# Patient Record
Sex: MALE | Race: WHITE
[De-identification: names, ages, dates, MRNs, and addresses within clinical notes are randomized per-mention and may not be internally consistent; named-entity substitution may affect disease eponyms.]

---

## 2019-06-26 ENCOUNTER — HOSPITAL ENCOUNTER (EMERGENCY)
Dept: HOSPITAL 41 - JD.ED | Age: 69
Discharge: HOME | End: 2019-06-26
Payer: MEDICARE

## 2019-06-26 VITALS — DIASTOLIC BLOOD PRESSURE: 64 MMHG | SYSTOLIC BLOOD PRESSURE: 97 MMHG

## 2019-06-26 DIAGNOSIS — E11.9: ICD-10-CM

## 2019-06-26 DIAGNOSIS — J44.9: ICD-10-CM

## 2019-06-26 DIAGNOSIS — Z79.899: ICD-10-CM

## 2019-06-26 DIAGNOSIS — I20.0: Primary | ICD-10-CM

## 2019-06-26 DIAGNOSIS — I10: ICD-10-CM

## 2019-06-26 DIAGNOSIS — Z79.82: ICD-10-CM

## 2019-06-26 DIAGNOSIS — Z79.84: ICD-10-CM

## 2019-06-26 DIAGNOSIS — Z87.891: ICD-10-CM

## 2019-06-26 PROCEDURE — 85027 COMPLETE CBC AUTOMATED: CPT

## 2019-06-26 PROCEDURE — 82962 GLUCOSE BLOOD TEST: CPT

## 2019-06-26 PROCEDURE — 99285 EMERGENCY DEPT VISIT HI MDM: CPT

## 2019-06-26 PROCEDURE — 85007 BL SMEAR W/DIFF WBC COUNT: CPT

## 2019-06-26 PROCEDURE — 82553 CREATINE MB FRACTION: CPT

## 2019-06-26 PROCEDURE — 96361 HYDRATE IV INFUSION ADD-ON: CPT

## 2019-06-26 PROCEDURE — 85379 FIBRIN DEGRADATION QUANT: CPT

## 2019-06-26 PROCEDURE — 84484 ASSAY OF TROPONIN QUANT: CPT

## 2019-06-26 PROCEDURE — 96360 HYDRATION IV INFUSION INIT: CPT

## 2019-06-26 PROCEDURE — 85610 PROTHROMBIN TIME: CPT

## 2019-06-26 PROCEDURE — 86140 C-REACTIVE PROTEIN: CPT

## 2019-06-26 PROCEDURE — 83735 ASSAY OF MAGNESIUM: CPT

## 2019-06-26 PROCEDURE — 80053 COMPREHEN METABOLIC PANEL: CPT

## 2019-06-26 PROCEDURE — 36415 COLL VENOUS BLD VENIPUNCTURE: CPT

## 2019-06-26 PROCEDURE — 83880 ASSAY OF NATRIURETIC PEPTIDE: CPT

## 2019-06-26 PROCEDURE — 93005 ELECTROCARDIOGRAM TRACING: CPT

## 2019-06-26 PROCEDURE — 85730 THROMBOPLASTIN TIME PARTIAL: CPT

## 2019-06-26 PROCEDURE — 71045 X-RAY EXAM CHEST 1 VIEW: CPT

## 2019-06-26 NOTE — EDM.PDOC
ED HPI GENERAL MEDICAL PROBLEM





- General


Chief Complaint: Chest Pain


Stated Complaint: CHEST PAINS


Time Seen by Provider: 06/26/19 19:38


Source of Information: Reports: Patient, Family (spouse)


History Limitations: Reports: No Limitations





- History of Present Illness


INITIAL COMMENTS - FREE TEXT/NARRATIVE: 





69-year-old male presents to the ED with his wife after developing sudden onset 

of central chest pressure squeezing pain while waiting for their supper to 

arrive in a restaurant tonight. This seemed to start a rate around 1900 hrs. He 

then broke out in a diaphoretic sweat and had to leave the table before the 

food arrived. He has no heartburn or indigestion or burping or belching today. 

He's had a past history of myocardial infarction in 1994. Apparently he 

suffered an MI and angiogram was negative. He has known stents in place. He 

quit smoking at that time. Was out mowing the lawn and finished up around 1815 

hours tonight without any problems. Patient did have his nitroglycerin with him 

and he took 2 of them he states it took quite a while barium to work but they 

seem to ease the pain up substantially. Reports the pressure discomfort is 

still a 2 out of 10 in the central chest. When he arrived his blood pressure 

was 93/64. It has improved to 100/68 now. Reports the pain does not seem to 

radiate through his back up into his neck or shoulder. Feels like there is a 

pressure or heaviness in his central chest and I appreciate that he's working 

hard to breathe. O2 sats 92% on room air. Patient denies any pain in his 

calves. No past history of DVT. He does take a baby aspirin every morning. No 

recent changes to any of his other medications.


Onset: Today


Onset Date: 06/26/19


Onset Time: 19:00


Duration: Minutes:


Location: Reports: Chest (Central chest pressure heaviness or squeezing 

discomfort with no radiation of the pain. Started about 1900 hrs. while he was 

awaiting his meal at a local restaurant. Vicodin is what became very 

diaphoretic cool and clammy and somewhat short of breath. You have is Dr. 

tablets with him and he is taken 2 tablets thus far. He reports a reduction in 

the)


Quality: Reports: Ache, Pressure


Improves with: Reports: Medication (She took 2 nitroglycerin tablets presumably 

0.4 mg strength.)


Worsens with: Reports: None


Context: Reports: Other.  Denies: Activity, Exercise, Lifting, Sick Contact, 

Trauma


Associated Symptoms: Reports: Chest Pain, Diaphoresis, Loss of Appetite, Malaise

, Shortness of Breath, Weakness.  Denies: Confusion, Cough, cough w sputum, 

Fever/Chills, Headaches, Nausea/Vomiting, Rash, Seizure, Syncope


Treatments PTA: Reports: Other (see below) (He took 2 nitroglycerin tablets 5 

minutes apart at approximate 1915 to 1925 hrs.)





- Related Data


 Allergies











Allergy/AdvReac Type Severity Reaction Status Date / Time


 


No Known Allergies Allergy   Verified 06/26/19 19:39











Home Meds: 


 Home Meds





Aspirin [Halfprin] 81 mg PO DAILY 01/14/17 [History]


Bimatoprost [LUMIGAN 0.01% Ophth Soln] 1 drop EYEBOTH QPM 01/14/17 [History]


Calcium Carbonate/Vitamin D3 [Calcium 600 + Vit D 200] 1 tab PO BID 01/14/17 [

History]


Fish Oil/Omega-3 Fatty Acids [Fish Oil] 1 each PO BID 01/14/17 [History]


Folic Acid. 400 mg PO DAILY 01/14/17 [History]


LORazepam 1 mg PO BEDTIME PRN 01/14/17 [History]


Metoprolol Tartrate [Lopressor] 50 mg PO BID 01/14/17 [History]


metFORMIN [Glucophage XR] 1,000 mg PO BIDMEALS 01/14/17 [History]











Past Medical History


HEENT History: Reports: Glaucoma, Macular Degeneration


Cardiovascular History: Reports: Hypertension, MI (1994. Unclear if he had 

angina but that time but he did not have any stenting or need for bypass at 

that time.)


Respiratory History: Reports: COPD (Used to smoke quite heavily. Quit in 1994.)


Musculoskeletal History: Reports: Arthritis


Psychiatric History: Reports: Other (See Below)


Other Psychiatric History: states has trouble sleeping at time


Endocrine/Metabolic History: Reports: Diabetes, Type II (On metformin 1000 mg 

twice a day.), Obesity/BMI 30+


Dermatologic History: Reports: Melanoma





- Infectious Disease History


Infectious Disease History: Reports: Chicken Pox, Measles, Mumps





- Past Surgical History


HEENT Surgical History: Reports: Eye Surgery





Social & Family History





- Tobacco Use


Smoking Status *Q: Former Smoker


Used Tobacco, but Quit: Yes


Month/Year Tobacco Last Used: 1995





- Caffeine Use


Caffeine Use: Reports: Soda


Other Caffeine Use: mountain dew daily





- Living Situation & Occupation


Living situation: Reports: 


Occupation: Retired





ED ROS GENERAL





- Review of Systems


Review Of Systems: See Below


Constitutional: Reports: Weakness, Fatigue, Diaphoresis (With onset of chest 

pressure discomfort.), Decreased Appetite.  Denies: Fever, Chills, Weight Loss


HEENT: Reports: Glasses


Respiratory: Reports: Shortness of Breath.  Denies: Wheezing, Pleuritic Chest 

Pain, Cough, Sputum


Cardiovascular: Reports: Chest Pain, Dyspnea on Exertion (Mild chronically), 

Lightheadedness.  Denies: Blood Pressure Problem (Antral chest pressure 

squeezing discomfort.), Claudication, Edema (Especially noted after taking the 

2 nitroglycerin tablets.), Orthopnea


Endocrine: Reports: Fatigue


GI/Abdominal: Reports: No Symptoms


: Reports: Frequency, Other (Nocturia usually 1.)


Musculoskeletal: Reports: Back Pain (Occasional back pain knee pain.)


Skin: Reports: Diaphoresis


Neurological: Reports: No Symptoms (Diaphoresis with onset of central chest 

pressure discomfort.), Dizziness (After taking the 2 nitroglycerin tablets.)


Psychiatric: Reports: No Symptoms





ED EXAM, GENERAL





- Physical Exam


Exam: See Below


Exam Limited By: No Limitations


General Appearance: Alert, WD/WN, Mild Distress, Other (He still tachypnea: 

Initial assessment. Blood pressure is slowly improving presumably dropped after 

taking 2 nitroglycerin tablets. Currently 100/68.)


Eye Exam: Bilateral Eye: Normal Inspection


Throat/Mouth: Normal Inspection, Normal Lips, Normal Oropharynx


Head: Atraumatic, Normocephalic


Neck: Normal Inspection, Supple, Non-Tender, Full Range of Motion.  No: Carotid 

Bruit, Lymphadenopathy (L), Lymphadenopathy (R)


Respiratory/Chest: No Respiratory Distress, Lungs Clear, Normal Breath Sounds, 

No Accessory Muscle Use, Decreased Breath Sounds.  No: Rales, Rhonchi (Breath 

sounds are mildly diminished to both lower lung fields. No adventitial sounds 

noted), Wheezing


Cardiovascular: Regular Rate, Rhythm, No Edema, No Gallop, No Murmur, No Rub.  

No: Normal Peripheral Pulses


Peripheral Pulses: 1+: Posterior Tibial (L), Posterior Tibial (R), Dorsalis 

Pedis (L), Dorsalis Pedis (R)


GI/Abdominal: Normal Bowel Sounds, Soft, Non-Tender, No Organomegaly, No 

Abnormal Bruit, No Mass, Pelvis Stable, Other


Back Exam: Normal Inspection, Full Range of Motion.  No: CVA Tenderness (L), 

CVA Tenderness (R)


Extremities: Normal Inspection, Normal Range of Motion, Non-Tender, No Pedal 

Edema, Other (Calves are nonswollen and nontender. No clinical evidence of DVT.)


Neurological: Alert, Oriented, CN II-XII Intact, Normal Cognition


Psychiatric: Normal Affect, Anxious (Mildly anxious and I believe in)


Skin Exam: Intact, Normal Color, No Rash, Cool (Skin is mildly cool to touch.).

  No: Diaphoretic (Not at this time)





EKG INTERPRETATION


EKG Date: 06/26/19


Time: 19:41


Rhythm: NSR


Rate (Beats/Min): 84


Axis: Normal


P-Wave: Present


QRS: Other (There are Q waves in leads II, III, and F aVF compatible with an 

old inferior wall myocardial infarction.)


ST-T: Normal


QT: Normal


EKG Interpretation Comments: 





Abnormal ECG





Course





- Vital Signs


Last Recorded V/S: 


 Last Vital Signs











Temp  37.0 C   06/26/19 19:39


 


Pulse  85   06/26/19 19:39


 


Resp  16   06/26/19 19:39


 


BP  97/64   06/26/19 19:39


 


Pulse Ox  90 L  06/26/19 20:14














- Orders/Labs/Meds


Orders: 


 Active Orders 24 hr











 Category Date Time Status


 


 Blood Glucose Check, Bedside [RC] ONETIME Care  06/26/19 19:59 Active


 


 EKG Documentation Completion [RC] ASDIRECTED Care  06/26/19 19:45 Active


 


 EKG Documentation Completion [RC] ASDIRECTED Care  06/26/19 22:03 Active


 


 EKG Documentation Completion [RC] STAT Care  06/26/19 19:50 Active


 


 Oxygen Therapy [RC] ASDIRECTED Care  06/26/19 19:50 Active


 


 Chest 1V Frontal [CR] Stat Exams  06/26/19 19:50 Taken


 


 EKG 12 Lead [EK] Stat Ther  06/26/19 19:44 Ordered


 


 EKG 12 Lead [EK] Stat Ther  06/26/19 22:03 Ordered











Labs: 


 Laboratory Tests











  06/26/19 06/26/19 06/26/19 Range/Units





  19:46 19:46 19:46 


 


WBC  6.36    (4.23-9.07)  K/mm3


 


RBC  4.86    (4.63-6.08)  M/mm3


 


Hgb  15.6    (13.7-17.5)  gm/L


 


Hct  44.9    (40.1-51.0)  %


 


MCV  92.4 H    (79.0-92.2)  fl


 


MCH  32.1    (25.7-32.2)  pg


 


MCHC  34.7    (32.2-35.5)  g/dl


 


RDW Std Deviation  43.2    (35.1-43.9)  fL


 


Plt Count  264    (163-337)  K/mm3


 


MPV  8.9 L    (9.4-12.3)  fl


 


Neutrophils % (Manual)  57    (40-60)  %


 


Band Neutrophils %  0    (0-10)  %


 


Lymphocytes % (Manual)  30    (20-40)  %


 


Atypical Lymphs %  0    %


 


Monocytes % (Manual)  7    (2-10)  %


 


Eosinophils % (Manual)  5    (0.8-7.0)  %


 


Basophils % (Manual)  1    (0.2-1.2)  


 


Platelet Estimate  Adequate    


 


Plt Morphology Comment  Normal    


 


RBC Morph Comment  Normal    


 


PT   11.0   (9.5-12.1)  SECONDS


 


INR   1.01   


 


APTT   25   (24-31)  SECONDS


 


D-Dimer, Quantitative     (0.19-0.50)  mg/L


 


Sodium    140  (136-145)  mEq/L


 


Potassium    4.0  (3.5-5.1)  mEq/L


 


Chloride    104  ()  mEq/L


 


Carbon Dioxide    24  (21-32)  mEq/L


 


Anion Gap    16.0 H  (5-15)  


 


BUN    16  (7-18)  mg/dL


 


Creatinine    1.1  (0.7-1.3)  mg/dL


 


Est Cr Clr Drug Dosing    64.52  mL/min


 


Estimated GFR (MDRD)    > 60  (>60)  mL/min


 


BUN/Creatinine Ratio    14.5  (14-18)  


 


Glucose    152 H  ()  mg/dL


 


POC Glucose     ()  mg/dL


 


Calcium    9.2  (8.5-10.1)  mg/dL


 


Magnesium    1.7 L  (1.8-2.4)  mg/dl


 


Total Bilirubin    0.7  (0.2-1.0)  mg/dL


 


AST    26  (15-37)  U/L


 


ALT    34  (16-63)  U/L


 


Alkaline Phosphatase    61  ()  U/L


 


CK-MB (CK-2)    0.7  (0-3.6)  ng/ml


 


Troponin I    < 0.017  (0.00-0.056)  ng/mL


 


C-Reactive Protein    < 0.2  (<1.0)  mg/dL


 


NT-Pro-B Natriuret Pep     (0-125)  pg/mL


 


Total Protein    7.0  (6.4-8.2)  g/dl


 


Albumin    4.0  (3.4-5.0)  g/dl


 


Globulin    3.0  gm/dL


 


Albumin/Globulin Ratio    1.3  (1-2)  














  06/26/19 06/26/19 06/26/19 Range/Units





  19:46 19:46 20:19 


 


WBC     (4.23-9.07)  K/mm3


 


RBC     (4.63-6.08)  M/mm3


 


Hgb     (13.7-17.5)  gm/L


 


Hct     (40.1-51.0)  %


 


MCV     (79.0-92.2)  fl


 


MCH     (25.7-32.2)  pg


 


MCHC     (32.2-35.5)  g/dl


 


RDW Std Deviation     (35.1-43.9)  fL


 


Plt Count     (163-337)  K/mm3


 


MPV     (9.4-12.3)  fl


 


Neutrophils % (Manual)     (40-60)  %


 


Band Neutrophils %     (0-10)  %


 


Lymphocytes % (Manual)     (20-40)  %


 


Atypical Lymphs %     %


 


Monocytes % (Manual)     (2-10)  %


 


Eosinophils % (Manual)     (0.8-7.0)  %


 


Basophils % (Manual)     (0.2-1.2)  


 


Platelet Estimate     


 


Plt Morphology Comment     


 


RBC Morph Comment     


 


PT     (9.5-12.1)  SECONDS


 


INR     


 


APTT     (24-31)  SECONDS


 


D-Dimer, Quantitative  0.53 H    (0.19-0.50)  mg/L


 


Sodium     (136-145)  mEq/L


 


Potassium     (3.5-5.1)  mEq/L


 


Chloride     ()  mEq/L


 


Carbon Dioxide     (21-32)  mEq/L


 


Anion Gap     (5-15)  


 


BUN     (7-18)  mg/dL


 


Creatinine     (0.7-1.3)  mg/dL


 


Est Cr Clr Drug Dosing     mL/min


 


Estimated GFR (MDRD)     (>60)  mL/min


 


BUN/Creatinine Ratio     (14-18)  


 


Glucose     ()  mg/dL


 


POC Glucose    157 H  ()  mg/dL


 


Calcium     (8.5-10.1)  mg/dL


 


Magnesium     (1.8-2.4)  mg/dl


 


Total Bilirubin     (0.2-1.0)  mg/dL


 


AST     (15-37)  U/L


 


ALT     (16-63)  U/L


 


Alkaline Phosphatase     ()  U/L


 


CK-MB (CK-2)     (0-3.6)  ng/ml


 


Troponin I     (0.00-0.056)  ng/mL


 


C-Reactive Protein     (<1.0)  mg/dL


 


NT-Pro-B Natriuret Pep   63   (0-125)  pg/mL


 


Total Protein     (6.4-8.2)  g/dl


 


Albumin     (3.4-5.0)  g/dl


 


Globulin     gm/dL


 


Albumin/Globulin Ratio     (1-2)  














  06/26/19 Range/Units





  22:00 


 


WBC   (4.23-9.07)  K/mm3


 


RBC   (4.63-6.08)  M/mm3


 


Hgb   (13.7-17.5)  gm/L


 


Hct   (40.1-51.0)  %


 


MCV   (79.0-92.2)  fl


 


MCH   (25.7-32.2)  pg


 


MCHC   (32.2-35.5)  g/dl


 


RDW Std Deviation   (35.1-43.9)  fL


 


Plt Count   (163-337)  K/mm3


 


MPV   (9.4-12.3)  fl


 


Neutrophils % (Manual)   (40-60)  %


 


Band Neutrophils %   (0-10)  %


 


Lymphocytes % (Manual)   (20-40)  %


 


Atypical Lymphs %   %


 


Monocytes % (Manual)   (2-10)  %


 


Eosinophils % (Manual)   (0.8-7.0)  %


 


Basophils % (Manual)   (0.2-1.2)  


 


Platelet Estimate   


 


Plt Morphology Comment   


 


RBC Morph Comment   


 


PT   (9.5-12.1)  SECONDS


 


INR   


 


APTT   (24-31)  SECONDS


 


D-Dimer, Quantitative   (0.19-0.50)  mg/L


 


Sodium   (136-145)  mEq/L


 


Potassium   (3.5-5.1)  mEq/L


 


Chloride   ()  mEq/L


 


Carbon Dioxide   (21-32)  mEq/L


 


Anion Gap   (5-15)  


 


BUN   (7-18)  mg/dL


 


Creatinine   (0.7-1.3)  mg/dL


 


Est Cr Clr Drug Dosing   mL/min


 


Estimated GFR (MDRD)   (>60)  mL/min


 


BUN/Creatinine Ratio   (14-18)  


 


Glucose   ()  mg/dL


 


POC Glucose   ()  mg/dL


 


Calcium   (8.5-10.1)  mg/dL


 


Magnesium   (1.8-2.4)  mg/dl


 


Total Bilirubin   (0.2-1.0)  mg/dL


 


AST   (15-37)  U/L


 


ALT   (16-63)  U/L


 


Alkaline Phosphatase   ()  U/L


 


CK-MB (CK-2)  0.5  (0-3.6)  ng/ml


 


Troponin I  < 0.017  (0.00-0.056)  ng/mL


 


C-Reactive Protein   (<1.0)  mg/dL


 


NT-Pro-B Natriuret Pep   (0-125)  pg/mL


 


Total Protein   (6.4-8.2)  g/dl


 


Albumin   (3.4-5.0)  g/dl


 


Globulin   gm/dL


 


Albumin/Globulin Ratio   (1-2)  











Meds: 


Medications














Discontinued Medications














Generic Name Dose Route Start Last Admin





  Trade Name Freq  PRN Reason Stop Dose Admin


 


Aspirin  324 mg  06/26/19 19:48  06/26/19 20:01





  Aspirin  PO  06/26/19 19:49  324 mg





  ONETIME ONE   Administration





     





     





     





     


 


Aspirin  Confirm  06/26/19 19:53  06/26/19 20:01





  Aspirin  Administered  06/26/19 19:54  Not Given





  Dose   





  324 mg   





  .ROUTE   





  .STK-MED ONE   





     





     





     





     


 


Aspirin  Confirm  06/26/19 19:53  06/26/19 20:01





  Aspirin  Administered  06/26/19 19:54  Not Given





  Dose   





  324 mg   





  .ROUTE   





  .STK-MED ONE   





     





     





     





     


 


Aspirin  Confirm  06/26/19 20:00  





  Aspirin  Administered  06/26/19 20:01  





  Dose   





  324 mg   





  .ROUTE   





  .STK-MED ONE   





     





     





     





     


 


Nitroglycerin/Dextrose  25 mg in 250 mls @ 3 mls/hr  06/26/19 20:00  





  Nitroglycerin  25 Mg/D5w 250 Ml  IV   





  TITRATE LAURA   





     





     





  Protocol   





  5 MCG/MIN   


 


Sodium Chloride  1,000 mls @ 100 mls/hr  06/26/19 20:00  06/26/19 19:57





  Normal Saline  IV   100 mls/hr





  ASDIRECTED LAURA   Administration





     





     





     





     














- Radiology Interpretation


Free Text/Narrative:: 


69-year-old male presents to the ED with to development of central chest 

pressure discomfort with squeezing component well seated at a local restaurant 

waiting for his meal to come. Chest pain started about 1900 hrs. He has a 

remote history of MI in 1994 and apparently did see cardiology services and 

subclavian and remember that time but he was told there is nothing they could 

do for him in terms that he suffered a heart attack but no stents were 

indicated. He stopped smoking at that time. He has a type II by a diabetic 

controlled with metformin 1000 mg twice a day. Does take aspirin 81 mg every 

morning. Examination reveals stigmata of mild COPD with no adventitial sounds. 

Heart is sinus with no murmurs identified. Blood pressures low at 97/68 

initially presumably from taking 2 nitroglycerin tabs. Clinically I believe he 

isn't likely an evolving myocardial infarction. He still has some central chest 

discomfort 2 out of 10. ECG does not show any signs of acute ischemia. Diffuse T

-wave flattening. Q waves in II, III, and AVL, aVF suggestive of an old 

inferior wall myocardial infarction. Plan :aspirin 324 mg chewed. Nitro drip at 

5 mcg/m. Will follow his blood pressure closely. Labs to be done including 

cardiac markers. Chest x-ray times one view. D-dimer will be done as well. He'

ll be started on oxygen since his O2 sats are 92% on room air.








- Re-Assessments/Exams


Free Text/Narrative Re-Assessment/Exam: 





06/26/19 20:30: Patient states he is chest pain-free. Blood pressure is 

hovering around 100 systolic. Chest x-ray done portably reveals some rightward 

deviation of the chest x-ray. This makes the left hilar area more prominent 

suggesting left prominent pulmonary artery. Cardiac silhouette is upper limits 

of normal in size. Lungs appear to be clear without pleural effusion.





06/26/19 21:00 Initial white count is 6.36 differential is pending. Hemoglobin 

is normal at 15.6 with hematocrit of 44.9. MCV is mildly elevated at 92.4. PT 

is 11.0 with an INR of 1.01. PTT is 25. D-dimer 0.53 normal for this patient's 

age. Sodium is 140 with a potassium of 4.0. Chloride is 104 bicarbonate 24. And 

a gap is mildly elevated at 16.0. BUN is 16 with a creatinine of 1.1. Blood 

glucose 152 in the lab on 57 at the bedside. Calcium 9.2 with magnesium 

slightly low at 1.7. Liver function is normal. CK-MB is 0.7 with a troponin I 

of less than 0.017. C-reactive protein is less than 0.2. BNP is 63. Total 

protein 7.0 with no been fraction of 4.0.





06/26/19 21:10 patient's history is fairly convincing for ischemia. He will 

have repeat cardiac markers at 2200 hrs. and remains pain-free at this time. 

Blood pressure is slowly improving. It is currently 117/68. Heart rate is 71 

and sinus





06/26/19 22:24  patient remains pain-free. ECG #2 completed reveals sinus 

rhythm at 71/m. There is mildly decreased voltage throughout the precordial 

leads. Q waves II, III, and F aVF but with an old inferior wall myocardial 

infarction. No signs of acute ischemia. Awaiting second set of cardiac markers.





06/26/19 22:56  Repeat cardiac markers returned normal with a CK-MB fraction of 

0.5 and troponin I of less than 0.017. Patient will be discharged to home 

however I am going to advise him to follow-up with his personal care physician 

or cardiologist to have an outpatient ECG stress test carried out  due to 

history of remote infarct in 1994. LDL with finger could cause to his 

significant pain like this is esophageal spasm which would've been relieved by 

nitroglycerin as well. Is having intermittent heartburn but states is no worse 

than usual. Discharged to home in care of his wife.














Departure





- Departure


Time of Disposition: 22:57


Disposition: Home, Self-Care 01


Condition: Fair


Clinical Impression: 


 Chest pain in adult, Unstable angina pectoris





Instructions:  Angina Pectoris, Easy-to-Read


Referrals: 


Ridge Plata MD [Primary Care Provider] - 


Forms:  ED Department Discharge


Additional Instructions: 


Evaluation the emergency room tonight in regards to development of severe 

central chest pressure discomfort while awaiting your supper at a local 

restaurant tonight. This pressure persisted and was bad enough to make him 

break out in a diffuse scleral sweat recall diaphoresis. Pain improved with 

taking nitroglycerin tablets 2 before coming to the ED. He still had 

significant chest pressure discomfort upon arrival in the ED. ECG done in the 

ED revealed sinus rhythm with evidence of an old heart attack in the right 

coronary artery or inferior wall of the heart. ECG done at 2 hours later 

revealed it to be unchanged from the first one. Lab tests occluding cardiac 

markers 2.2 be negative for any signs of heart attack. However your history is 

highly suspect for possible heart related illness. No signs of blood clot were 

identified in your lung on testing either. It is my suggestion to continue 

current medications but follow-up with your personal care provider to arrange 

for an outpatient ECG stress test within the next week to 10 days to make sure 

you do not have any recurrence of heart related illness. Coarse return to the 

ED if you develop any further chest pressure or pain .





- My Orders


Last 24 Hours: 


My Active Orders





06/26/19 19:44


EKG 12 Lead [EK] Stat 





06/26/19 19:45


EKG Documentation Completion [RC] ASDIRECTED 





06/26/19 19:50


EKG Documentation Completion [RC] STAT 


Oxygen Therapy [RC] ASDIRECTED 


Chest 1V Frontal [CR] Stat 





06/26/19 19:59


Blood Glucose Check, Bedside [RC] ONETIME 





06/26/19 22:03


EKG Documentation Completion [RC] ASDIRECTED 


EKG 12 Lead [EK] Stat 














- Assessment/Plan


Last 24 Hours: 


My Active Orders





06/26/19 19:44


EKG 12 Lead [EK] Stat 





06/26/19 19:45


EKG Documentation Completion [RC] ASDIRECTED 





06/26/19 19:50


EKG Documentation Completion [RC] STAT 


Oxygen Therapy [RC] ASDIRECTED 


Chest 1V Frontal [CR] Stat 





06/26/19 19:59


Blood Glucose Check, Bedside [RC] ONETIME 





06/26/19 22:03


EKG Documentation Completion [RC] ASDIRECTED 


EKG 12 Lead [EK] Stat

## 2019-06-27 NOTE — CR
Chest: Portable view of the chest was obtained.

 

Comparison: Prior chest x-ray of 01/14/17.

 

Heart size and mediastinum are normal.  Lungs are clear.  Bony 

structures are grossly intact.

 

Impression:

1.  Nothing acute is seen on portable chest x-ray.

 

Diagnostic code #1

## 2019-09-19 NOTE — PCM.PREANE
Preanesthetic Assessment





- Procedure


Proposed Procedure: 





cataract left





- Anesthesia/Transfusion/Family Hx


Anesthesia History: Prior Anesthesia Without Reaction


Family History of Anesthesia Reaction: No


Transfusion History: No Prior Transfusion(s)





- Review of Systems


General: No Symptoms


Pulmonary: Cough, Other (bacterial bronchitis- just getting over it.)


Cardiovascular: No Symptoms


Gastrointestinal: No Symptoms


Neurological: No Symptoms


Other: Reports: Diabetes





- Physical Assessment


NPO Status Date: 09/18/19


NPO Status Time: 20:00


Vital Signs: 





 Last Vital Signs











Temp  97.4 F   09/19/19 07:40


 


Pulse  65   09/19/19 07:40


 


Resp  16   09/19/19 07:40


 


BP  115/75   09/19/19 07:40


 


Pulse Ox  96   09/19/19 07:40











Height: 5 ft 9 in


Weight: 115.212 kg


ASA Class: 2


Mental Status: Alert & Oriented x3


Airway Class: Mallampati = 1


Dentition: Reports: Dentures (out)


Thyro-Mental Finger Breadths: 3


Mouth Opening Finger Breadths: 3


ROM/Head Extension: Full


Lungs: Clear to Auscultation, Normal Respiratory Effort


Cardiovascular: Regular Rate, Irregular Rhythm





- Allergies


Allergies/Adverse Reactions: 


 Allergies











Allergy/AdvReac Type Severity Reaction Status Date / Time


 


No Known Allergies Allergy   Verified 06/26/19 19:39














- Blood


Blood Available: No





- Anesthesia Plan


Pre-Op Medication Ordered: Beta Blocker


Beta Blocker: Metoprolol


Med Last Dose Date: 09/19/19


Med Last Dose Time: 07:30





- Acknowledgements


Anesthesia Type Planned: MAC


Pt an Appropriate Candidate for the Planned Anesthesia: Yes


Alternatives and Risks of Anesthesia Discussed w Pt/Guardian: Yes


Pt/Guardian Understands and Agrees with Anesthesia Plan: Yes





PreAnesthesia Questionnaire


HEENT History: Reports: Glaucoma, Macular Degeneration


Cardiovascular History: Reports: Hypertension, MI (1994. Unclear if he had 

angina but that time but he did not have any stenting or need for bypass at 

that time.)


Respiratory History: Reports: COPD (Used to smoke quite heavily. Quit in 1994.)


Musculoskeletal History: Reports: Arthritis


Psychiatric History: Reports: Other (See Below)


Other Psychiatric History: states has trouble sleeping at time


Endocrine/Metabolic History: Reports: Diabetes, Type II (On metformin 1000 mg 

twice a day.), Obesity/BMI 30+


Dermatologic History: Reports: Melanoma





- Infectious Disease History


Infectious Disease History: Reports: Chicken Pox, Measles, Mumps





- Past Surgical History


HEENT Surgical History: Reports: Eye Surgery





- HOME MEDS


Home Medications: 


 Home Meds





Bimatoprost [LUMIGAN 0.01% Ophth Soln] 1 drop EYEBOTH QPM 01/14/17 [History]


Calcium Carbonate/Vitamin D3 [Calcium 600 + Vit D 200] 1 tab PO BID 01/14/17 [

History]


Fish Oil/Omega-3 Fatty Acids [Fish Oil] 1 each PO BID 01/14/17 [History]


Metoprolol Tartrate [Lopressor] 50 mg PO BID 01/14/17 [History]


metFORMIN [Glucophage XR] 1,000 mg PO BIDMEALS 01/14/17 [History]


Enalapril Maleate 20 mg PO DAILY 09/18/19 [History]


Pravastatin [Pravachol] 40 mg PO DAILY 09/18/19 [History]


predniSONE [Prednisone] 20 mg PO ASDIRECTED 09/18/19 [History]











- CURRENT (IN HOUSE) MEDS


Current Meds: 





 Current Medications





Brimonidine Tartrate (Alphagan 0.2% Ophth Soln)  0 ml EYELF ASDIRECTED LAURA


   Stop: 09/19/19 18:00


   Last Admin: 09/19/19 09:10 Dose:  1 drop


Cefuroxime Sodium (Zinacef)  0 mg EYELF ASDIRECTED LAURA


   Stop: 09/19/19 18:00


Lidocaine HCl (Xylocaine-Mpf 1%)  0 ml INJECT ASDIRECTED LAURA


   Stop: 09/19/19 18:00


Phenylephrine HCl (Nelson-Synephrine 2.5% Ophth Soln)  0 ml EYELF ASDIRECTED LAURA


   Stop: 09/19/19 18:00


   Last Admin: 09/19/19 09:15 Dose:  1 drop


Pilocarpine HCl (Pilocar 4% Ophth Soln)  0 ml EYELF ASDIRECTED LAURA


   Stop: 09/19/19 18:00


Polymyxin/Trimethoprim Sulfate (Polytrim Ophth Soln)  0 ml EYELF ASDIRECTED LAURA


   Stop: 09/19/19 18:00


   Last Admin: 09/19/19 09:05 Dose:  1 drop


Tetracaine HCl (Tetracaine 0.5% Steri-Unit Sol)  0 ml EYELF ASDIRECTED LAURA


   Stop: 09/19/19 18:00


   Last Admin: 09/19/19 09:37 Dose:  1 drop


Tropicamide (Mydriacyl 1% Ophth Soln)  0 ml EYELF ASDIRECTED LAURA


   Stop: 09/19/19 18:00


   Last Admin: 09/19/19 09:20 Dose:  1 drop

## 2019-09-19 NOTE — [id number]
Post Anesthesia Note





- EVALUATION WITHIN 48HRS OF ANESTHETIC


Vital Signs in Normal Range: Yes


Patient Participated in Evaluation: Yes


Respiratory Function Stable: Yes


Airway Patent: Yes


Cardiovascular Function Stable: Yes


Hydration Status Stable: Yes


Pain Control Satisfactory: Yes


Nausea and Vomiting Control Satisfactory: Yes


Mental Status Recovered: Yes


Vital Signs: 


 Last Vital Signs











Temp  36.3 C   09/19/19 07:40


 


Pulse  65   09/19/19 07:40


 


Resp  16   09/19/19 07:40


 


BP  115/75   09/19/19 07:40


 


Pulse Ox  96   09/19/19 07:40

## 2021-06-14 NOTE — EDM.PDOC
ED HPI GENERAL MEDICAL PROBLEM





- General


Chief Complaint: Chest Pain


Stated Complaint: SOB/CHEST DISCOMFORT


Time Seen by Provider: 06/14/21 17:07


Source of Information: Reports: Patient, RN Notes Reviewed





- History of Present Illness


INITIAL COMMENTS - FREE TEXT/NARRATIVE: 





71 yr old male has had some chest pressure off and on for 2 weeks.  Has become 

more persistent yesterday and today. Has also been more short of breath with 

exertion than usual.  Hx Htn, type 2 diabetes.  Not coughing, no fever or 

chills. 


  ** Left Chest


Pain Score (Numeric/FACES): 4





- Related Data


                                    Allergies











Allergy/AdvReac Type Severity Reaction Status Date / Time


 


No Known Allergies Allergy   Verified 06/14/21 17:01











Home Meds: 


                                    Home Meds





Calcium Carbonate/Vitamin D3 [Calcium 600 + Vit D 200] 1 tab PO BID 01/14/17 

[History]


Metoprolol Tartrate [Lopressor] 50 mg PO BID 01/14/17 [History]


metFORMIN [Glucophage XR] 1,000 mg PO BIDMEALS 01/14/17 [History]


Enalapril Maleate 20 mg PO BID 09/18/19 [History]


Pravastatin [Pravachol] 40 mg PO DAILY 09/18/19 [History]


Aspirin [Aspirin EC] 81 mg PO DAILY 06/14/21 [History]


Biotin 5,000 mcg SL 06/14/21 [History]


Folic Acid 1 mg PO DAILY 06/14/21 [History]


LORazepam [Lorazepam] 1 mg PO ASDIRECTED PRN 06/14/21 [History]


Methotrexate 2.5 mg PO Q7D 06/14/21 [History]


Ubidecarenone [Coq-10] 100 mg PO DAILY 06/14/21 [History]











Past Medical History


HEENT History: Reports: Glaucoma, Macular Degeneration


Cardiovascular History: Reports: Hypertension, MI


Respiratory History: Reports: COPD


Musculoskeletal History: Reports: Arthritis


Psychiatric History: Reports: Other (See Below)


Other Psychiatric History: states has trouble sleeping at time


Endocrine/Metabolic History: Reports: Diabetes, Type II, Obesity/BMI 30+


Dermatologic History: Reports: Melanoma





- Infectious Disease History


Infectious Disease History: Reports: Chicken Pox, Measles, Mumps





- Past Surgical History


HEENT Surgical History: Reports: Eye Surgery





Social & Family History





- Tobacco Use


Tobacco Use Status *Q: Current Every Day Tobacco User


Years of Tobacco use: 50


Packs/Tins Daily: 0.2





- Caffeine Use


Caffeine Use: Reports: Soda


Other Caffeine Use: mountain dew daily





- Recreational Drug Use


Recreational Drug Use: No





- Living Situation & Occupation


Living situation: Reports: 


Occupation: Retired





ED ROS GENERAL





- Review of Systems


Review Of Systems: See Below


Constitutional: Denies: Fever, Chills, Diaphoresis


HEENT: Reports: No Symptoms


Respiratory: Reports: Shortness of Breath.  Denies: Pleuritic Chest Pain, Cough


Cardiovascular: Reports: Chest Pain, Palpitations (occasional)


GI/Abdominal: Denies: Abdominal Pain, Nausea, Vomiting


Musculoskeletal: Denies: Shoulder Pain, Arm Pain, Back Pain


Skin: Reports: No Symptoms


Neurological: Reports: No Symptoms





ED EXAM, GENERAL





- Physical Exam


Exam: See Below


General Appearance: Alert, No Apparent Distress


Throat/Mouth: Normal Inspection


Head: Atraumatic


Neck: Supple, Other (no JVD)


Respiratory/Chest: No Respiratory Distress, Lungs Clear, Normal Breath Sounds.  

No: Rales, Rhonchi, Wheezing


Cardiovascular: Irregularly Irregular


GI/Abdominal: Soft, Non-Tender.  No: Guarding


Back Exam: No: CVA Tenderness (L), CVA Tenderness (R)


Extremities: Normal Inspection.  No: Pedal Edema, Leg Pain, Increased Warmth, 

Redness


Skin Exam: Warm, Dry, Normal Color


  ** #1 Interpretation


EKG Date: 06/14/21


Rhythm: A-Fib


Rate (Beats/Min): 96


Axis: Normal


P-Wave: Absent


QRS: Normal


ST-T: Other (flatenned T waves inf. leads.)





Course





- Vital Signs


Last Recorded V/S: 


                                Last Vital Signs











Temp  97.1 F   06/14/21 16:58


 


Pulse  108 H  06/14/21 17:33


 


Resp  26 H  06/14/21 16:58


 


BP  121/59 L  06/14/21 17:33


 


Pulse Ox  96   06/14/21 16:58














- Orders/Labs/Meds


Orders: 


                               Active Orders 24 hr











 Category Date Time Status


 


 EKG 12 Lead [EKG Documentation Completion] [RC] STAT Care  06/14/21 17:00 

Active


 


 Peripheral IV Care [RC] .AS DIRECTED Care  06/14/21 17:15 Active


 


 Chest 1V Frontal [CR] Stat Exams  06/14/21 17:15 Taken


 


 Sodium Chloride 0.9% [Saline Flush] Med  06/14/21 17:14 Active





 10 ml FLUSH ASDIRECTED PRN   


 


 Peripheral IV Insertion Adult [OM.PC] Stat Oth  06/14/21 17:15 Ordered








                                Medication Orders





Sodium Chloride (Sodium Chloride 0.9% 10 Ml Syringe)  10 ml FLUSH ASDIRECTED PRN


   PRN Reason: Keep Vein Open


   Last Admin: 06/14/21 17:20  Dose: 10 ml


   Documented by: [email or web address]








Labs: 


                                Laboratory Tests











  06/14/21 06/14/21 Range/Units





  17:05 17:05 


 


WBC  6.36   (4.23-9.07)  K/mm3


 


RBC  4.66   (4.63-6.08)  M/mm3


 


Hgb  15.1   (13.7-17.5)  gm/dl


 


Hct  44.6   (40.1-51.0)  %


 


MCV  95.7 H D   (79.0-92.2)  fl


 


MCH  32.4 H   (25.7-32.2)  pg


 


MCHC  33.9   (32.2-35.5)  g/dl


 


RDW Std Deviation  45.8 H   (35.1-43.9)  fL


 


Plt Count  254   (163-337)  K/mm3


 


MPV  9.1 L   (9.4-12.3)  fl


 


Neut % (Auto)  57.8   (34.0-67.9)  %


 


Lymph % (Auto)  25.6   (21.8-53.1)  %


 


Mono % (Auto)  11.3   (5.3-12.2)  %


 


Eos % (Auto)  4.7   (0.8-7.0)  


 


Baso % (Auto)  0.3   (0.1-1.2)  %


 


Neut # (Auto)  3.67   (1.78-5.38)  K/mm3


 


Lymph # (Auto)  1.63   (1.32-3.57)  K/mm3


 


Mono # (Auto)  0.72   (0.30-0.82)  K/mm3


 


Eos # (Auto)  0.30   (0.04-0.54)  K/mm3


 


Baso # (Auto)  0.02   (0.01-0.08)  K/mm3


 


Sodium   140  (136-145)  mEq/L


 


Potassium   4.2  (3.5-5.1)  mEq/L


 


Chloride   104  ()  mEq/L


 


Carbon Dioxide   24  (21-32)  mEq/L


 


Anion Gap   16.2 H  (5-15)  


 


BUN   12  (7-18)  mg/dL


 


Creatinine   0.8  (0.7-1.3)  mg/dL


 


Est Cr Clr Drug Dosing   83.47  mL/min


 


Estimated GFR (MDRD)   > 60  (>60)  mL/min


 


BUN/Creatinine Ratio   15.0  (14-18)  


 


Glucose   136 H  (70-99)  mg/dL


 


Calcium   9.0  (8.5-10.1)  mg/dL


 


Total Bilirubin   0.5  (0.2-1.0)  mg/dL


 


AST   12 L  (15-37)  U/L


 


ALT   19  (16-63)  U/L


 


Alkaline Phosphatase   58  ()  U/L


 


Troponin I   < 0.017  (0.00-0.056)  ng/mL


 


Total Protein   6.7  (6.4-8.2)  g/dl


 


Albumin   3.6  (3.4-5.0)  g/dl


 


Globulin   3.1  gm/dL


 


Albumin/Globulin Ratio   1.2  (1-2)  











Meds: 


Medications











Generic Name Dose Route Start Last Admin





  Trade Name Freq  PRN Reason Stop Dose Admin


 


Sodium Chloride  10 ml  06/14/21 17:14  06/14/21 17:20





  Sodium Chloride 0.9% 10 Ml Syringe  FLUSH   10 ml





  ASDIRECTED PRN   Administration





  Keep Vein Open  














Discontinued Medications














Generic Name Dose Route Start Last Admin





  Trade Name Freq  PRN Reason Stop Dose Admin


 


Metoprolol Tartrate  50 mg  06/14/21 17:24  06/14/21 17:33





  Metoprolol Tartrate 50 Mg Tab  PO  06/14/21 17:25  50 mg





  ONETIME ONE   Administration














- Re-Assessments/Exams


Free Text/Narrative Re-Assessment/Exam: 





06/14/21 19:32


Gave him a dose of oral metropolol and with that his rate has come down into the

 80's and 90's.  No known prior hx of a fib.  trop is neg, other labs are fine, 

CXR looks normal.  I did discuss need for reasonable rate control and also 

stroke risk.  He does not want to start anticoagulation this evening.  He is on 

daily aspirin, metropolol 50 bid in addition to his other meds.  Discharge 

instr. as documented.  





Departure





- Departure


Time of Disposition: 19:15


Disposition: Home, Self-Care 01


Condition: Fair


Clinical Impression: 


 Atypical chest pain





Atrial fibrillation


Qualifiers:


 Atrial fibrillation type: paroxysmal Qualified Code(s): I48.0 - Paroxysmal 

atrial fibrillation





Instructions:  Nonspecific Chest Pain, Adult, Easy-to-Read, Atrial Fibrillation,

 Easy-to-Read


Referrals: 


Ridge Plata MD [Primary Care Provider] - 


Forms:  ED Department Discharge


Additional Instructions: 


Continue current medications as previously prescribed.  Try avoid salty food and

salt as best you can.  Avoid carbs and sugar foods as best you can.  [facility] 

has good cardiac healthy dietary guidelines available.  The [facility] diet is

also cardiac healthy.  See Dr Plata, Dr Gomez, or Barbara in 2 to 3 days or 

next available appointment.  Return to ED as needed.  





Sepsis Event Note (ED)





- Evaluation


Sepsis Screening Result: No Definite Risk





- Focused Exam


Vital Signs: 


                                   Vital Signs











  Temp Pulse Pulse Resp BP BP Pulse Ox


 


 06/14/21 17:33   108 H    121/59 L  


 


 06/14/21 16:58  97.1 F   112 H  26 H   146/87 H  96














- My Orders


Last 24 Hours: 


My Active Orders





06/14/21 17:00


EKG 12 Lead [EKG Documentation Completion] [RC] STAT 





06/14/21 17:14


Sodium Chloride 0.9% [Saline Flush]   10 ml FLUSH ASDIRECTED PRN 





06/14/21 17:15


Peripheral IV Care [RC] .AS DIRECTED 


Chest 1V Frontal [CR] Stat 


Peripheral IV Insertion Adult [OM.PC] Stat 














- Assessment/Plan


Last 24 Hours: 


My Active Orders





06/14/21 17:00


EKG 12 Lead [EKG Documentation Completion] [RC] STAT 





06/14/21 17:14


Sodium Chloride 0.9% [Saline Flush]   10 ml FLUSH ASDIRECTED PRN 





06/14/21 17:15


Peripheral IV Care [RC] .AS DIRECTED 


Chest 1V Frontal [CR] Stat 


Peripheral IV Insertion Adult [OM.PC] Stat

## 2021-06-15 NOTE — CR
Chest: Portable view of the chest was obtained.

 

Comparison: Prior chest x-ray of 06/26/19.

 

Heart size and mediastinum are within normal limits.  Lungs are clear 

with no acute parenchymal change.  No acute osseous abnormality is 

appreciated.

 

Impression:

1.  Nothing acute is seen on portable chest x-ray.

 

Diagnostic code #1

## 2021-07-21 NOTE — EDM.PDOC
ED HPI GENERAL MEDICAL PROBLEM





- General


Chief Complaint: Head Injury


Stated Complaint: HEAD INJURY


Time Seen by Provider: 07/21/21 18:09


Source of Information: Reports: Patient


History Limitations: Reports: No Limitations





- History of Present Illness


INITIAL COMMENTS - FREE TEXT/NARRATIVE: 





The patient presents with a head injury.  He was backing his  off of 

his  and he hit a hole and tipped over and hit his head.  He had no LOC. 

He does not remember the incident though.  He has bleeding from the right side 

of his head.  He has a bad headache.  He has no neck pain, chest pain, abdominal

pain, nausea, vomiting, numbness or weakness.  He has no fever, chills, or 

cough.  He is not on any blood thinners.


Onset: Sudden


Duration: Minutes:


Location: Reports: Head


Quality: Reports: Sharp


Severity: Moderate


Improves with: Reports: None


Worsens with: Reports: None


Associated Symptoms: Reports: Headaches.  Denies: Chest Pain, Cough, 

Fever/Chills, Nausea/Vomiting, Shortness of Breath





- Related Data


                                    Allergies











Allergy/AdvReac Type Severity Reaction Status Date / Time


 


No Known Allergies Allergy   Verified 07/21/21 17:58











Home Meds: 


                                    Home Meds





Folic Acid 1 mg PO DAILY 06/14/21 [History]


Methotrexate 2.5 mg PO Q7D 06/14/21 [History]











Past Medical History


HEENT History: Reports: Glaucoma, Macular Degeneration


Cardiovascular History: Reports: Hypertension, MI


Respiratory History: Reports: COPD


Musculoskeletal History: Reports: Arthritis


Psychiatric History: Reports: Other (See Below)


Other Psychiatric History: states has trouble sleeping at time


Endocrine/Metabolic History: Reports: Diabetes, Type II, Obesity/BMI 30+


Dermatologic History: Reports: Melanoma





- Infectious Disease History


Infectious Disease History: Reports: Chicken Pox, Measles, Mumps





- Past Surgical History


HEENT Surgical History: Reports: Eye Surgery





Social & Family History





- Tobacco Use


Tobacco Use Status *Q: Current Every Day Tobacco User


Years of Tobacco use: 50


Packs/Tins Daily: 0.5





- Caffeine Use


Caffeine Use: Reports: None


Other Caffeine Use: mountain dew daily





- Recreational Drug Use


Recreational Drug Use: No





- Living Situation & Occupation


Living situation: Reports: 


Occupation: Retired





ED ROS GENERAL





- Review of Systems


Review Of Systems: See Below


Constitutional: Reports: No Symptoms


HEENT: Reports: Other (contusion and abrasion to the right parietal region)


Respiratory: Reports: No Symptoms


Cardiovascular: Reports: No Symptoms


Endocrine: Reports: No Symptoms


GI/Abdominal: Reports: No Symptoms


: Reports: No Symptoms


Musculoskeletal: Reports: No Symptoms





ED EXAM, HEAD INJURY





- Physical Exam


Exam: See Below


Exam Limited By: No Limitations


General Appearance: Alert, No Apparent Distress


Head: Other (Edema and abrasion to the right parietal region)


Ears: Normal External Exam


Nose: Normal Inspection


Neck: Non-Tender, Normal Alignment, Normal Inspection


Respiratory: No Respiratory Distress, Lungs Clear, Normal Breath Sounds


Cardiovascular: Regular Rate, Rhythm, No Edema, No Murmur


GI/Abdominal Exam: Soft, Non-Tender, No Organomegaly, No Mass


Back Exam: Normal Inspection


Extremities: Normal Inspection





Course





- Vital Signs


Last Recorded V/S: 


                                Last Vital Signs











Temp  97 F   07/21/21 17:57


 


Pulse  92   07/21/21 17:57


 


Resp  16   07/21/21 17:57


 


BP  136/79   07/21/21 17:57


 


Pulse Ox  95   07/21/21 17:57














- Orders/Labs/Meds


Orders: 


                               Active Orders 24 hr











 Category Date Time Status


 


 Cardiac Monitoring [RC] .AS DIRECTED Care  07/21/21 18:51 Active


 


 Peripheral IV Care [RC] .AS DIRECTED Care  07/21/21 18:51 Active


 


 CORONAVIRUS COVID-19 CURTIS [MOLEC] Stat Lab  07/21/21 19:05 Received


 


 Sodium Chloride 0.9% [Saline Flush] Med  07/21/21 18:51 Active





 10 ml FLUSH ASDIRECTED PRN   


 


 Peripheral IV Insertion Adult [OM.PC] Routine Oth  07/21/21 18:51 Ordered


 


 Peripheral IV Insertion Adult [OM.PC] Stat Oth  07/21/21 18:51 Ordered








                                Medication Orders





Sodium Chloride (Sodium Chloride 0.9% 10 Ml Syringe)  10 ml FLUSH ASDIRECTED PRN


   PRN Reason: Keep Vein Open


   Last Admin: 07/21/21 19:18  Dose: 10 ml


   Documented by: SHANNAN








Labs: 


                                Laboratory Tests











  07/21/21 07/21/21 07/21/21 Range/Units





  19:05 19:05 19:05 


 


WBC  10.09 H    (4.23-9.07)  K/mm3


 


RBC  4.65    (4.63-6.08)  M/mm3


 


Hgb  15.1    (13.7-17.5)  gm/dl


 


Hct  44.2    (40.1-51.0)  %


 


MCV  95.1 H    (79.0-92.2)  fl


 


MCH  32.5 H    (25.7-32.2)  pg


 


MCHC  34.2    (32.2-35.5)  g/dl


 


RDW Std Deviation  45.1 H    (35.1-43.9)  fL


 


Plt Count  239    (163-337)  K/mm3


 


MPV  8.7 L    (9.4-12.3)  fl


 


Neut % (Auto)  77.7 H    (34.0-67.9)  %


 


Lymph % (Auto)  11.4 L    (21.8-53.1)  %


 


Mono % (Auto)  9.4    (5.3-12.2)  %


 


Eos % (Auto)  1.1    (0.8-7.0)  


 


Baso % (Auto)  0.2    (0.1-1.2)  %


 


Neut # (Auto)  7.84 H    (1.78-5.38)  K/mm3


 


Lymph # (Auto)  1.15 L    (1.32-3.57)  K/mm3


 


Mono # (Auto)  0.95 H    (0.30-0.82)  K/mm3


 


Eos # (Auto)  0.11    (0.04-0.54)  K/mm3


 


Baso # (Auto)  0.02    (0.01-0.08)  K/mm3


 


Manual Slide Review  Normal smear    


 


PT   11.3   (9.7-12.0)  SECONDS


 


INR   1.06   


 


APTT   25.1   (21.7-31.4)  SECONDS


 


Sodium    141  (136-145)  mEq/L


 


Potassium    4.1  (3.5-5.1)  mEq/L


 


Chloride    104  ()  mEq/L


 


Carbon Dioxide    24  (21-32)  mEq/L


 


Anion Gap    17.1 H  (5-15)  


 


BUN    14  (7-18)  mg/dL


 


Creatinine    0.9  (0.7-1.3)  mg/dL


 


Est Cr Clr Drug Dosing    74.19  mL/min


 


Estimated GFR (MDRD)    > 60  (>60)  mL/min


 


BUN/Creatinine Ratio    15.6  (14-18)  


 


Glucose    175 H  (70-99)  mg/dL


 


Calcium    9.1  (8.5-10.1)  mg/dL


 


Total Bilirubin    0.9  (0.2-1.0)  mg/dL


 


AST    22  (15-37)  U/L


 


ALT    22  (16-63)  U/L


 


Alkaline Phosphatase    57  ()  U/L


 


Total Protein    6.8  (6.4-8.2)  g/dl


 


Albumin    3.8  (3.4-5.0)  g/dl


 


Globulin    3.0  gm/dL


 


Albumin/Globulin Ratio    1.3  (1-2)  











Meds: 


Medications











Generic Name Dose Route Start Last Admin





  Trade Name Freq  PRN Reason Stop Dose Admin


 


Sodium Chloride  10 ml  07/21/21 18:51  07/21/21 19:18





  Sodium Chloride 0.9% 10 Ml Syringe  FLUSH   10 ml





  ASDIRECTED PRN   Administration





  Keep Vein Open  














Discontinued Medications














Generic Name Dose Route Start Last Admin





  Trade Name Freq  PRN Reason Stop Dose Admin


 


Acetaminophen  975 mg  07/21/21 18:34  07/21/21 18:40





  Acetaminophen 325 Mg Tab  PO  07/21/21 18:35  975 mg





  NOW ONE   Administration


 


Desmopressin Acetate 40 mcg/  60 mls @ 150 mls/hr  07/21/21 19:23 





  Sodium Chloride  IV  07/21/21 19:43 





  ONETIME ONE  


 


Ondansetron HCl  4 mg  07/21/21 18:38  07/21/21 18:42





  Ondansetron 4 Mg Tab.Dis  PO  07/21/21 18:39  4 mg





  ONETIME ONE   Administration


 


Ondansetron HCl  4 mg  07/21/21 18:52  07/21/21 19:18





  Ondansetron 4 Mg/2 Ml Sdv  IVPUSH  07/21/21 18:53  4 mg





  ONETIME ONE   Administration














- Re-Assessments/Exams


Free Text/Narrative Re-Assessment/Exam: 





07/21/21 18:32


I ordered a CT of his head.  He had a headache so I ordered tylenol 975mg PO.


07/21/21 19:08


The CT shows a subdural hematoma on the left side with additional parenchymal 

hemorrhage.  Findings cause significant mass-effect with midline shift of 

approximately 6.5mm.  Soft tissue hematoma within the right parietal scalp.  I 

ordered an IV saline lock and labs.  I called [facility] and talked with 

Dr Hunt the neurosurgeon and he accepted the patient.  Dr Hunt did want 

platelets and desmopressin given.  We do not have platelets here but I did give 

him desmopressin 40mcg IV.


07/21/21 19:37


I also talked with Dr Iraheta the ER doctor and he accepted as well.  He wanted 

me to be aggressive with securing his airway to avoid deterioration during 

flight.  He was alert and orientated to person place and time while here and he 

talked with his wife on the phone.  I do not think I need to intubate him.





Departure





- Departure


Time of Disposition: 20:00


Disposition: DC/[facility] to [facility]


Condition: Serious


Clinical Impression: 


 Subdural hematoma, Intracranial hemorrhage





Fall


Qualifiers:


 Encounter type: initial encounter Qualified Code(s): W19.XXXA - Unspecified 

fall, initial encounter








- Discharge Information


Referrals: 


Ridge Plata MD [Primary Care Provider] - 


Forms:  ED Department Discharge





Sepsis Event Note (ED)





- Evaluation


Sepsis Screening Result: No Definite Risk





- Focused Exam


Vital Signs: 


                                   Vital Signs











  Temp Pulse Resp BP Pulse Ox


 


 07/21/21 17:57  97 F  92  16  136/79  95














- My Orders


Last 24 Hours: 


My Active Orders





07/21/21 18:51


Cardiac Monitoring [RC] .AS DIRECTED 


Peripheral IV Care [RC] .AS DIRECTED 


Sodium Chloride 0.9% [Saline Flush]   10 ml FLUSH ASDIRECTED PRN 


Peripheral IV Insertion Adult [OM.PC] Routine 


Peripheral IV Insertion Adult [OM.PC] Stat 





07/21/21 19:05


CORONAVIRUS COVID-19 CURTIS [MOLEC] Stat 














- Assessment/Plan


Last 24 Hours: 


My Active Orders





07/21/21 18:51


Cardiac Monitoring [RC] .AS DIRECTED 


Peripheral IV Care [RC] .AS DIRECTED 


Sodium Chloride 0.9% [Saline Flush]   10 ml FLUSH ASDIRECTED PRN 


Peripheral IV Insertion Adult [OM.PC] Routine 


Peripheral IV Insertion Adult [OM.PC] Stat 





07/21/21 19:05


CORONAVIRUS COVID-19 CURTIS [MOLEC] Stat

## 2021-07-21 NOTE — CT
Head CT

 

Technique: Multiple axial sections through the brain were obtained.  

Intravenous contrast was not utilized.  Reconstructed coronal and 

sagittal images were obtained.

 

Comparison: No prior intracranial imaging is available.

 

Findings: Subdural hematoma is seen on the left side which appears 

acute and involves the left temporal, left frontal, and left parietal 

regions.  Subdural hematoma has a thickness of around 1.0 cm.  This 

causes localized mass-effect.

 

There is an additional parenchymal hemorrhage being seen within the 

left parietal region and left periventricular white matter.

 

Both the above findings cause significant midline shift by about 6.5 

mm.  Soft tissue hematoma is noted within the right parietal scalp.

 

Bone window settings were reviewed which show no acute calvarial 

abnormality.  Visualized mastoid sinuses are clear.  Mucosal 

thickening is noted within the left maxillary sinus.

 

Impression:

1.  Subdural hematoma on the left side with additional parenchymal 

hemorrhage.  Findings cause significant mass-effect with midline shift

 of approximately 6.5 mm.

2.  Soft tissue hematoma within the right parietal scalp.

 

Diagnostic code #5

## 2021-08-06 NOTE — CT
Head CT

 

Technique: Multiple axial sections through the brain were obtained.  

Intravenous contrast was not utilized.  Reconstructed coronal and 

sagittal images were obtained.

 

Comparison: Prior head CT study of 07/21/21.

 

Findings: Slight subdural fluid collection is seen.  Current maximum 

thickness within this collection is 5.6 mm comparing to 1.0 cm on 

prior study.  Most of the increased density has diminished compatible 

with resolving hemorrhage.  There are only 2 small areas of very 

slight increased density compatible with slower resolving hemorrhage 

on current study.  No new areas of abnormal hemorrhage are seen.  

 

Mild midline shift is seen by 7.1 mm.  On prior study midline shift 

was approximately 7.8 mm.

 

Areas of parenchymal hemorrhage seen on prior study have diminished on

 current exam.

 

No other abnormal parenchymal densities are otherwise seen.

 

There is asymmetric widening of the subdural space along the left 

cerebellar hemisphere which appears stable.

 

Bone window settings were reviewed which show no acute findings within

 the visualized mastoid or paranasal sinus.  No acute calvarial 

abnormality is seen.

 

Impression:

1.  Resolving intraparenchymal and subdural hemorrhages as described 

above.

2.  Mild midline shift of 7.1 mm compared to 7.8 mm on prior study.

3.  Nothing acute is appreciated.

 

Diagnostic code #2

## 2021-08-06 NOTE — EDM.PDOC
ED HPI GENERAL MEDICAL PROBLEM





- General


Chief Complaint: Neurological Problem


Stated Complaint: WEAK AND NOT ABLE TO WALK


Time Seen by Provider: 08/06/21 12:41


Source of Information: Reports: Patient, Family (spoue), RN Notes Reviewed





- History of Present Illness


INITIAL COMMENTS - FREE TEXT/NARRATIVE: 





71 yr old male brought in by wife for eval for generalized weakness,  difficulty

walking.  He has hx of a fall aout 2 1/2 wks ago.  Head CT at that time showed a

subdural.  He was transferred to [facility],  admitted to their neuro 

unit for about 2 wks, just released home 4 days ago.  Has mild Ha, Did have 

severe Ha initially. He did vomit once this AM after eating small amt of 

breakfast.  No known fever, chills.  No chest pain or difficulty breathing at 

time of exam.   





- Related Data


                                    Allergies











Allergy/AdvReac Type Severity Reaction Status Date / Time


 


No Known Allergies Allergy   Verified 07/21/21 17:58











Home Meds: 


                                    Home Meds





Folic Acid 1 mg PO DAILY 06/14/21 [History]


Methotrexate 2.5 mg PO Q7D 06/14/21 [History]


Ammonium Lactate [Lac-Hydrin 12% Crm] 1 applic TOP BID 07/21/21 [History]


Clindamycin Phosphate 1 07/21/21 [History]











Past Medical History


HEENT History: Reports: Glaucoma, Macular Degeneration


Cardiovascular History: Reports: Hypertension, MI


Respiratory History: Reports: COPD


Musculoskeletal History: Reports: Arthritis


Psychiatric History: Reports: Other (See Below)


Other Psychiatric History: states has trouble sleeping at time


Endocrine/Metabolic History: Reports: Diabetes, Type II, Obesity/BMI 30+


Dermatologic History: Reports: Melanoma





- Infectious Disease History


Infectious Disease History: Reports: Chicken Pox, Measles, Mumps





- Past Surgical History


HEENT Surgical History: Reports: Eye Surgery





Social & Family History





- Tobacco Use


Tobacco Use Status *Q: Current Every Day Tobacco User


Years of Tobacco use: 30


Packs/Tins Daily: 1





- Caffeine Use


Caffeine Use: Reports: Coffee, Soda


Other Caffeine Use: mountain dew daily





- Recreational Drug Use


Recreational Drug Use: No





- Living Situation & Occupation


Living situation: Reports: 


Occupation: Retired





ED ROS GENERAL





- Review of Systems


Review Of Systems: See Below


Constitutional: Denies: Fever, Chills, Diaphoresis


HEENT: Reports: No Symptoms


Respiratory: Denies: Shortness of Breath, Pleuritic Chest Pain


Cardiovascular: Denies: Chest Pain


GI/Abdominal: Reports: Nausea, Vomiting.  Denies: Abdominal Pain


Musculoskeletal: Denies: Shoulder Pain, Arm Pain


Skin: Reports: No Symptoms


Neurological: Reports: Dizziness, Headache, Weakness (generalized)





ED EXAM, GENERAL





- Physical Exam


Exam: See Below


General Appearance: Alert, No Apparent Distress


Throat/Mouth: Normal Inspection


Head: Atraumatic


Neck: Supple


Respiratory/Chest: No Respiratory Distress, Lungs Clear, Normal Breath Sounds


Cardiovascular: Regular Rate, Rhythm


GI/Abdominal: Soft, Non-Tender


Extremities: Normal Inspection, Normal Range of Motion.  No: Pedal Edema, Leg 

Pain, Redness


Neurological: Alert, No Motor/Sensory Deficits


Skin Exam: Warm, Dry, Normal Color





Course





- Orders/Labs/Meds


Orders: 


                               Active Orders 24 hr











 Category Date Time Status


 


 Peripheral IV Care [RC] .AS DIRECTED Care  08/06/21 12:56 Active


 


 Sodium Chloride 0.9% [Normal Saline] 1,000 ml Med  08/06/21 14:45 Active





 IV ONETIME   


 


 Sodium Chloride 0.9% [Saline Flush] Med  08/06/21 12:55 Active





 10 ml FLUSH ASDIRECTED PRN   


 


 Peripheral IV Insertion Adult [OM.PC] Stat Oth  08/06/21 12:55 Ordered








                                Medication Orders





Sodium Chloride (Normal Saline)  1,000 mls @ 999 mls/hr IV ONETIME [facility]


   Last Admin: 08/06/21 15:50  Dose: 999 mls/hr


   Documented by: SURJIT


Sodium Chloride (Sodium Chloride 0.9% 10 Ml Syringe)  10 ml FLUSH ASDIRECTED PRN


   PRN Reason: Keep Vein Open


   Last Admin: 08/06/21 13:52  Dose: 10 ml


   Documented by: SURJIT








Labs: 


                                Laboratory Tests











  08/06/21 08/06/21 Range/Units





  13:10 13:10 


 


WBC  6.14   (4.23-9.07)  K/mm3


 


RBC  4.91   (4.63-6.08)  M/mm3


 


Hgb  15.6   (13.7-17.5)  gm/dl


 


Hct  46.8   (40.1-51.0)  %


 


MCV  95.3 H   (79.0-92.2)  fl


 


MCH  31.8   (25.7-32.2)  pg


 


MCHC  33.3   (32.2-35.5)  g/dl


 


RDW Std Deviation  45.7 H   (35.1-43.9)  fL


 


Plt Count  372 H D   (163-337)  K/mm3


 


MPV  8.6 L   (9.4-12.3)  fl


 


Neut % (Auto)  68.1 H   (34.0-67.9)  %


 


Lymph % (Auto)  17.4 L   (21.8-53.1)  %


 


Mono % (Auto)  11.7   (5.3-12.2)  %


 


Eos % (Auto)  2.0   (0.8-7.0)  


 


Baso % (Auto)  0.5   (0.1-1.2)  %


 


Neut # (Auto)  4.18   (1.78-5.38)  K/mm3


 


Lymph # (Auto)  1.07 L   (1.32-3.57)  K/mm3


 


Mono # (Auto)  0.72   (0.30-0.82)  K/mm3


 


Eos # (Auto)  0.12   (0.04-0.54)  K/mm3


 


Baso # (Auto)  0.03   (0.01-0.08)  K/mm3


 


Sodium   141  (136-145)  mEq/L


 


Potassium   5.0  (3.5-5.1)  mEq/L


 


Chloride   103  ()  mEq/L


 


Carbon Dioxide   25  (21-32)  mEq/L


 


Anion Gap   18.0 H  (5-15)  


 


BUN   19 H  (7-18)  mg/dL


 


Creatinine   1.1  (0.7-1.3)  mg/dL


 


Est Cr Clr Drug Dosing   62.68  mL/min


 


Estimated GFR (MDRD)   > 60  (>60)  mL/min


 


BUN/Creatinine Ratio   17.3  (14-18)  


 


Glucose   130 H  (70-99)  mg/dL


 


Calcium   9.7  (8.5-10.1)  mg/dL


 


Total Bilirubin   0.6  (0.2-1.0)  mg/dL


 


AST   16  (15-37)  U/L


 


ALT   11 L  (16-63)  U/L


 


Alkaline Phosphatase   82  ()  U/L


 


Total Protein   7.6  (6.4-8.2)  g/dl


 


Albumin   3.6  (3.4-5.0)  g/dl


 


Globulin   4.0  gm/dL


 


Albumin/Globulin Ratio   0.9 L  (1-2)  











Meds: 


Medications











Generic Name Dose Route Start Last Admin





  Trade Name Steve  PRN Reason Stop Dose Admin


 


Sodium Chloride  1,000 mls @ 999 mls/hr  08/06/21 14:45  08/06/21 15:50





  Normal Saline  IV   999 mls/hr





  ONETIME LAURA   Administration


 


Sodium Chloride  10 ml  08/06/21 12:55  08/06/21 13:52





  Sodium Chloride 0.9% 10 Ml Syringe  FLUSH   10 ml





  ASDIRECTED PRN   Administration





  Keep Vein Open  














- Re-Assessments/Exams


Free Text/Narrative Re-Assessment/Exam: 





08/06/21 15:35


Head CT shows resolving hematoma, see Radiologist report for details.  Labs 

relatively OK.  Have ordered a 500 ml fluid bolus.  


16:25.  Pt is up walking well without difficulty.  Will send back to NH at this 

time.  











Departure





- Departure


Time of Disposition: 16:26


Disposition: Home, Self-Care 01


Condition: Fair


Clinical Impression: 


 Difficulty walking, Hx of subdural hematoma








- Discharge Information


Referrals: 


Ridge Plata MD [Primary Care Provider] - 


Forms:  ED Department Discharge


Additional Instructions: 


Encourage fluids to maintain hydration.  Continue current medications for now.  

Return to ED as needed if symptoms worsening in any way.    





- My Orders


Last 24 Hours: 


My Active Orders





08/06/21 12:55


Sodium Chloride 0.9% [Saline Flush]   10 ml FLUSH ASDIRECTED PRN 


Peripheral IV Insertion Adult [OM.PC] Stat 





08/06/21 12:56


Peripheral IV Care [RC] .AS DIRECTED 





08/06/21 14:45


Sodium Chloride 0.9% [Normal Saline] 1,000 ml IV ONETIME 














- Assessment/Plan


Last 24 Hours: 


My Active Orders





08/06/21 12:55


Sodium Chloride 0.9% [Saline Flush]   10 ml FLUSH ASDIRECTED PRN 


Peripheral IV Insertion Adult [OM.PC] Stat 





08/06/21 12:56


Peripheral IV Care [RC] .AS DIRECTED 





08/06/21 14:45


Sodium Chloride 0.9% [Normal Saline] 1,000 ml IV ONETIME